# Patient Record
Sex: FEMALE | Race: WHITE | NOT HISPANIC OR LATINO | URBAN - METROPOLITAN AREA
[De-identification: names, ages, dates, MRNs, and addresses within clinical notes are randomized per-mention and may not be internally consistent; named-entity substitution may affect disease eponyms.]

---

## 2017-02-25 ENCOUNTER — EMERGENCY (EMERGENCY)
Facility: HOSPITAL | Age: 57
LOS: 1 days | Discharge: HOME | End: 2017-02-25
Attending: EMERGENCY MEDICINE | Admitting: EMERGENCY MEDICINE
Payer: COMMERCIAL

## 2017-02-25 VITALS
HEART RATE: 73 BPM | TEMPERATURE: 99 F | WEIGHT: 159.17 LBS | RESPIRATION RATE: 17 BRPM | SYSTOLIC BLOOD PRESSURE: 121 MMHG | OXYGEN SATURATION: 97 % | DIASTOLIC BLOOD PRESSURE: 81 MMHG

## 2017-02-25 DIAGNOSIS — R20.2 PARESTHESIA OF SKIN: ICD-10-CM

## 2017-02-25 DIAGNOSIS — R53.1 WEAKNESS: ICD-10-CM

## 2017-02-25 DIAGNOSIS — Z79.899 OTHER LONG TERM (CURRENT) DRUG THERAPY: ICD-10-CM

## 2017-02-25 DIAGNOSIS — R29.810 FACIAL WEAKNESS: ICD-10-CM

## 2017-02-25 PROCEDURE — 70450 CT HEAD/BRAIN W/O DYE: CPT | Mod: 26

## 2017-02-25 PROCEDURE — 93010 ELECTROCARDIOGRAM REPORT: CPT | Mod: NC

## 2017-02-25 PROCEDURE — 99284 EMERGENCY DEPT VISIT MOD MDM: CPT | Mod: 25

## 2017-02-25 PROCEDURE — 70450 CT HEAD/BRAIN W/O DYE: CPT

## 2017-02-25 PROCEDURE — 93005 ELECTROCARDIOGRAM TRACING: CPT

## 2017-02-25 RX ORDER — BUPROPION HYDROCHLORIDE 150 MG/1
0 TABLET, EXTENDED RELEASE ORAL
Qty: 0 | Refills: 0 | COMMUNITY

## 2017-02-25 RX ORDER — LAMOTRIGINE 25 MG/1
0 TABLET, ORALLY DISINTEGRATING ORAL
Qty: 0 | Refills: 0 | COMMUNITY

## 2017-02-25 RX ORDER — TRANEXAMIC ACID 100 MG/ML
0 INJECTION, SOLUTION INTRAVENOUS
Qty: 0 | Refills: 0 | COMMUNITY

## 2017-02-25 NOTE — ED ADULT NURSE NOTE - OBJECTIVE STATEMENT
Pt CO Worsening Right sided Numbness since Thursday, seen at [facility] ED yesterday with same complaint.  Pt states "From my face down to my RLE I've been feeling numb and at [facility] they did a CT and an MRI and nothing was found, but within the past hour its gotten worse."  Pt denies Dizziness, N/V/D, SOB, Fevers, and any Pain at this time.

## 2017-02-25 NOTE — ED ADULT TRIAGE NOTE - CHIEF COMPLAINT QUOTE
right arm numbness that started 2 days ago but got worse and unable to lift right arm 1 hr ago. No pmhx.  A&Ox4, speaking clearly in full sentences, ambulated with steady gait, no facial droop.  Denies chest pain, sob, n/v/d, fever, chills, dizziness

## 2017-02-25 NOTE — ED PROVIDER NOTE - NEUROLOGICAL, MLM
Alert and oriented, no focal deficits, no motor or sensory deficits. mild subj numbness R face- yareli portion

## 2017-02-25 NOTE — ED PROVIDER NOTE - OBJECTIVE STATEMENT
56 F co facial numbness- R sided facial numbness/arm numbness weakness x 2 days- went to neuro yesterday and to ED - had MRI/MRA head and neck- both negative  came back to ED b/c sx were worse today  no n/v no weakness no dizziness  mild-moderate  no radiation  no exac factors  better after she took a nap earlier today